# Patient Record
Sex: FEMALE | Race: ASIAN | NOT HISPANIC OR LATINO | Employment: FULL TIME | URBAN - METROPOLITAN AREA
[De-identification: names, ages, dates, MRNs, and addresses within clinical notes are randomized per-mention and may not be internally consistent; named-entity substitution may affect disease eponyms.]

---

## 2019-02-26 ENCOUNTER — OFFICE VISIT (OUTPATIENT)
Dept: PODIATRY | Facility: CLINIC | Age: 54
End: 2019-02-26
Payer: COMMERCIAL

## 2019-02-26 VITALS
SYSTOLIC BLOOD PRESSURE: 130 MMHG | HEIGHT: 59 IN | RESPIRATION RATE: 17 BRPM | HEART RATE: 78 BPM | DIASTOLIC BLOOD PRESSURE: 72 MMHG | BODY MASS INDEX: 26.21 KG/M2 | WEIGHT: 130 LBS

## 2019-02-26 DIAGNOSIS — Q66.52 CONGENITAL PES PLANUS OF LEFT FOOT: ICD-10-CM

## 2019-02-26 DIAGNOSIS — Q66.51 CONGENITAL PES PLANUS OF RIGHT FOOT: ICD-10-CM

## 2019-02-26 DIAGNOSIS — M67.472 GANGLION CYST OF LEFT FOOT: ICD-10-CM

## 2019-02-26 DIAGNOSIS — M79.672 PAIN IN BOTH FEET: ICD-10-CM

## 2019-02-26 DIAGNOSIS — M21.969 ACQUIRED DEFORMITY OF FOOT, UNSPECIFIED LATERALITY: Primary | ICD-10-CM

## 2019-02-26 DIAGNOSIS — M79.671 PAIN IN BOTH FEET: ICD-10-CM

## 2019-02-26 PROBLEM — M25.571 PAIN IN JOINTS OF BOTH FEET: Status: ACTIVE | Noted: 2019-02-26

## 2019-02-26 PROBLEM — M25.572 PAIN IN JOINTS OF BOTH FEET: Status: ACTIVE | Noted: 2019-02-26

## 2019-02-26 PROCEDURE — L3000 FT INSERT UCB BERKELEY SHELL: HCPCS | Performed by: PODIATRIST

## 2019-02-26 PROCEDURE — 73620 X-RAY EXAM OF FOOT: CPT | Performed by: PODIATRIST

## 2019-02-26 PROCEDURE — 99203 OFFICE O/P NEW LOW 30 MIN: CPT | Performed by: PODIATRIST

## 2019-02-26 RX ORDER — MELOXICAM 7.5 MG/1
7.5 TABLET ORAL DAILY
Qty: 30 TABLET | Refills: 0 | OUTPATIENT
Start: 2019-02-26 | End: 2019-03-05

## 2019-02-26 RX ORDER — RIBOFLAVIN (VITAMIN B2) 100 MG
100 TABLET ORAL DAILY
COMMUNITY

## 2019-02-26 RX ORDER — OMEGA-3-ACID ETHYL ESTERS 1 G/1
2 CAPSULE, LIQUID FILLED ORAL 2 TIMES DAILY
COMMUNITY

## 2019-02-26 RX ORDER — NICOTINE POLACRILEX 2 MG
GUM BUCCAL
COMMUNITY

## 2019-02-26 NOTE — PROGRESS NOTES
Assessment/Plan:  Osteoarthritis foot bilateral   Plantar fasciitis bilateral   Rule out inflammatory arthropathy  Rule out Lyme disease  Pes planus  Pain upon ambulation  Ganglion formation left instep  Plan  Foot exam performed  X-rays taken  Blood work is being ordered to rule out inflammatory arthropathy  Patient be started on Mobic  Her feet have been casted for custom molded foot orthotics  We recommend removal of 3 ganglion of left foot  No problem-specific Assessment & Plan notes found for this encounter  There are no diagnoses linked to this encounter  Subjective:  Patient has pain in her feet  She has no history of trauma  Pain has been ongoing for several months  She has pain upon rising  She has pain in the heels  She also has pain in her toes with ambulation  No past medical history on file  No past surgical history on file  Allergies no known allergies      Current Outpatient Medications:     Ascorbic Acid (VITAMIN C) 100 MG tablet, Take 100 mg by mouth daily, Disp: , Rfl:     Biotin 1 MG CAPS, Take by mouth, Disp: , Rfl:     Garlic 10 MG CAPS, Take by mouth, Disp: , Rfl:     omega-3-acid ethyl esters (LOVAZA) 1 g capsule, Take 2 g by mouth 2 (two) times a day, Disp: , Rfl:     There is no problem list on file for this patient  Patient ID: Gail Cohen is a 52 y o  female  HPI    The following portions of the patient's history were reviewed and updated as appropriate: She  has no past medical history on file  She There are no active problems to display for this patient  She  has no past surgical history on file  Her family history is not on file  She  reports that she has never smoked  She has never used smokeless tobacco  Her alcohol and drug histories are not on file    Current Outpatient Medications   Medication Sig Dispense Refill    Ascorbic Acid (VITAMIN C) 100 MG tablet Take 100 mg by mouth daily      Biotin 1 MG CAPS Take by mouth  Garlic 10 MG CAPS Take by mouth      omega-3-acid ethyl esters (LOVAZA) 1 g capsule Take 2 g by mouth 2 (two) times a day       No current facility-administered medications for this visit  Current Outpatient Medications on File Prior to Visit   Medication Sig    Ascorbic Acid (VITAMIN C) 100 MG tablet Take 100 mg by mouth daily    Biotin 1 MG CAPS Take by mouth    Garlic 10 MG CAPS Take by mouth    omega-3-acid ethyl esters (LOVAZA) 1 g capsule Take 2 g by mouth 2 (two) times a day     No current facility-administered medications on file prior to visit  She has No Known Allergies       Vitals:    02/26/19 0934   BP: 130/72   Pulse: 78   Resp: 17       Review of Systems      Objective:  Patient's shoes and socks removed  Foot Exam    General  General Appearance: appears stated age and healthy   Orientation: alert and oriented to person, place, and time   Affect: appropriate   Gait: antalgic       Right Foot/Ankle     Inspection and Palpation  Ecchymosis: none  Tenderness: great toe interphalangeal joint, great toe metatarsophalangeal joint, lesser metatarsophalangeal joints and metatarsals   Swelling: dorsum and metatarsals   Arch: pes planus  Hammertoes: fifth toe  Hallux valgus: no  Hallux limitus: yes  Skin Exam: dry skin;     Neurovascular  Dorsalis pedis: 2+  Posterior tibial: 3+  Achilles reflex: 1+      Left Foot/Ankle      Inspection and Palpation  Ecchymosis: none  Tenderness: great toe interphalangeal joint, great toe metatarsophalangeal joint, lesser metatarsophalangeal joints and metatarsals   Swelling: dorsum and metatarsals   Arch: pes planus  Hammertoes: fifth toe  Hallux valgus: no  Hallux limitus: yes  Skin Exam: dry skin;     Neurovascular  Dorsalis pedis: 2+  Posterior tibial: 3+  Achilles reflex: 1+        Physical Exam   Constitutional: She appears well-developed and well-nourished     Cardiovascular:   Pulses:       Dorsalis pedis pulses are 2+ on the right side, and 2+ on the left side  Posterior tibial pulses are 3+ on the right side, and 3+ on the left side  Musculoskeletal:   Patient is maximally pronated in stance and gait  She has osteoarthritis development of 1st MPJ bilateral   Left instep at the dorsal aspect of the Lisfranc joint demonstrates 3 lobulated cysts in the subcutaneous tissue  They  lumenate with transluminescence   Had appeared to be ganglion  X-ray  Significant osteoarthritis of the 1st MPJ bilateral   This jean joint bilateral demonstrates dorsal spurring  Early plantar calcaneal spurring noted as well   Feet:   Right Foot:   Skin Integrity: Positive for dry skin  Left Foot:   Skin Integrity: Positive for dry skin  Neurological: She is alert  Reflex Scores:       Achilles reflexes are 1+ on the right side and 1+ on the left side  Skin: Skin is warm and dry  Capillary refill takes less than 2 seconds  Psychiatric: She has a normal mood and affect  Vitals reviewed

## 2019-03-01 LAB
ANA SER QL: NEGATIVE
B BURGDOR IGG PATRN SER IB-IMP: NEGATIVE
B BURGDOR IGG+IGM SER-ACNC: <0.91 ISR (ref 0–0.9)
B BURGDOR IGM PATRN SER IB-IMP: NEGATIVE
B BURGDOR18KD IGG SER QL IB: NORMAL
B BURGDOR23KD IGG SER QL IB: NORMAL
B BURGDOR23KD IGM SER QL IB: NORMAL
B BURGDOR28KD IGG SER QL IB: NORMAL
B BURGDOR30KD IGG SER QL IB: NORMAL
B BURGDOR39KD IGG SER QL IB: NORMAL
B BURGDOR39KD IGM SER QL IB: NORMAL
B BURGDOR41KD IGG SER QL IB: NORMAL
B BURGDOR41KD IGM SER QL IB: NORMAL
B BURGDOR45KD IGG SER QL IB: NORMAL
B BURGDOR58KD IGG SER QL IB: NORMAL
B BURGDOR66KD IGG SER QL IB: NORMAL
B BURGDOR93KD IGG SER QL IB: NORMAL
ERYTHROCYTE [SEDIMENTATION RATE] IN BLOOD BY WESTERGREN METHOD: 40 MM/HR (ref 0–40)
RHEUMATOID FACT SERPL-ACNC: 11.8 IU/ML (ref 0–13.9)

## 2019-03-05 ENCOUNTER — HOSPITAL ENCOUNTER (EMERGENCY)
Facility: HOSPITAL | Age: 54
Discharge: HOME | End: 2019-03-05
Attending: EMERGENCY MEDICINE
Payer: COMMERCIAL

## 2019-03-05 ENCOUNTER — APPOINTMENT (EMERGENCY)
Dept: RADIOLOGY | Facility: HOSPITAL | Age: 54
End: 2019-03-05
Payer: COMMERCIAL

## 2019-03-05 VITALS
OXYGEN SATURATION: 99 % | DIASTOLIC BLOOD PRESSURE: 95 MMHG | RESPIRATION RATE: 18 BRPM | HEART RATE: 74 BPM | WEIGHT: 130 LBS | HEIGHT: 59 IN | TEMPERATURE: 99.2 F | BODY MASS INDEX: 26.21 KG/M2 | SYSTOLIC BLOOD PRESSURE: 181 MMHG

## 2019-03-05 DIAGNOSIS — S89.92XA INJURY OF LEFT KNEE, INITIAL ENCOUNTER: Primary | ICD-10-CM

## 2019-03-05 PROCEDURE — 73564 X-RAY EXAM KNEE 4 OR MORE: CPT

## 2019-03-05 PROCEDURE — 99283 EMERGENCY DEPT VISIT LOW MDM: CPT

## 2019-03-05 RX ORDER — NAPROXEN 500 MG/1
500 TABLET ORAL ONCE
Status: COMPLETED | OUTPATIENT
Start: 2019-03-05 | End: 2019-03-05

## 2019-03-05 RX ORDER — NAPROXEN 500 MG/1
500 TABLET ORAL 2 TIMES DAILY WITH MEALS
Qty: 20 TABLET | Refills: 0 | Status: SHIPPED | OUTPATIENT
Start: 2019-03-05 | End: 2019-04-09

## 2019-03-05 RX ADMIN — NAPROXEN 500 MG: 500 TABLET ORAL at 10:27

## 2019-03-28 ENCOUNTER — OFFICE VISIT (OUTPATIENT)
Dept: URGENT CARE | Facility: CLINIC | Age: 54
End: 2019-03-28
Payer: COMMERCIAL

## 2019-03-28 VITALS
OXYGEN SATURATION: 98 % | HEIGHT: 59 IN | SYSTOLIC BLOOD PRESSURE: 163 MMHG | TEMPERATURE: 98.3 F | RESPIRATION RATE: 16 BRPM | HEART RATE: 78 BPM | DIASTOLIC BLOOD PRESSURE: 92 MMHG | BODY MASS INDEX: 28.02 KG/M2 | WEIGHT: 139 LBS

## 2019-03-28 DIAGNOSIS — S83.92XA SPRAIN OF LEFT KNEE, UNSPECIFIED LIGAMENT, INITIAL ENCOUNTER: Primary | ICD-10-CM

## 2019-03-28 DIAGNOSIS — I83.90 UNCOMPLICATED VARICOSE VEINS: ICD-10-CM

## 2019-03-28 PROCEDURE — 99213 OFFICE O/P EST LOW 20 MIN: CPT | Performed by: PHYSICIAN ASSISTANT

## 2019-03-28 NOTE — PATIENT INSTRUCTIONS
Begin wearing compression stockings  Try to put these on before getting out of bed in the morning and remove at bedtime  Take ibuprofen and tylenol as directed  Apply ice to your knee for 20-30 minutes at a time, 3-4 times daily  Rest and elevate your legs as often as possible  Wear an Ace bandage or knee brace during all waking hours and remove at bedtime  Follow up with Orthopedics in the next 5 days  Proceed to the ER if symptoms worsen

## 2019-03-28 NOTE — PROGRESS NOTES
[facility]        NAME: Ziggy Ibanez is a 52 y o  female  : 1965    MRN: [id number]  DATE: 2019  TIME: 4:31 PM    Assessment and Plan   Sprain of left knee, unspecified ligament, initial encounter [S83  92XA]  1  Sprain of left knee, unspecified ligament, initial encounter  Ambulatory referral to Orthopedic Surgery   2  Uncomplicated varicose veins       Patient Instructions   Begin wearing compression stockings  Try to put these on before getting out of bed in the morning and remove at bedtime  Take ibuprofen and tylenol as directed  Apply ice to your knee for 20-30 minutes at a time, 3-4 times daily  Rest and elevate your legs as often as possible  Wear an Ace bandage or knee brace during all waking hours and remove at bedtime  Follow up with Orthopedics in the next 5 days  Proceed to the ER if symptoms worsen  The diagnosis, etiology, expected course of illness, and treatment plan were reviewed  All questions answered  Precautions given  Patient verbalized understanding and agreement the treatment plan  Chief Complaint     Chief Complaint   Patient presents with    Knee Pain     after snow storm leg got jammed when her foot accessed a deep hole and knee became jammed  Had ER visit xray taken given Naprosin and did not help     History of Present Illness   49-year-old female presenting with complaint of left knee pain x 3 5 weeks  Pt notes she injured the leg prior to onset of sx when she stepped into a snowbank while cleaning off her car  She notes the snow/ground moved beneath her causing her to begin to fall forwards  She was able to brace herself prior to falling completely, but notes she felt her knee stop and plant as her upper body went forward  She notes pain was initially in the back of the knee but now is a sharp pain in the front of the knee and the upper inner aspect of the lower leg   Pain associated with swelling that occurred 3-4 days ago, that came on after being on her feet for a long day  Pain is worse with movements such as walking and straightening the leg and relieved with bending and sitting  She has attempted treating with naproxen, but now that completed has switched to Advil 400 mg QD  She was seen in the ER on day after onset  XRays were taken and reported by pt as negative  Pain does not radiate and is not associated with redness, bruising, N/T/W  No previous injury  Review of Systems   Review of Systems   Constitutional: Negative for chills, diaphoresis and fever  Respiratory: Negative for cough (  ), shortness of breath and wheezing  Cardiovascular: Negative for chest pain and palpitations  Gastrointestinal: Negative for abdominal pain, diarrhea, nausea and vomiting  Musculoskeletal: Negative for myalgias  Skin: Negative for color change, rash and wound  Neurological: Negative for light-headedness and headaches  Current Medications       Current Outpatient Medications:     Ascorbic Acid (VITAMIN C) 100 MG tablet, Take 100 mg by mouth daily, Disp: , Rfl:     Biotin 1 MG CAPS, Take by mouth, Disp: , Rfl:     Garlic 10 MG CAPS, Take by mouth, Disp: , Rfl:     omega-3-acid ethyl esters (LOVAZA) 1 g capsule, Take 2 g by mouth 2 (two) times a day, Disp: , Rfl:     naproxen (NAPROSYN) 500 mg tablet, Take 1 tablet (500 mg total) by mouth 2 (two) times a day with meals for 10 days, Disp: 20 tablet, Rfl: 0    Current Allergies     Allergies as of 03/28/2019    (No Known Allergies)          The following portions of the patient's history were reviewed and updated as appropriate: allergies, current medications, past family history, past medical history, past social history, past surgical history and problem list      History reviewed  No pertinent past medical history  History reviewed  No pertinent surgical history  History reviewed  No pertinent family history  Medications have been verified      Objective   /92   Pulse 78 Temp 98 3 °F (36 8 °C)   Resp 16   Ht 4' 10 75" (1 492 m)   Wt 63 kg (139 lb)   SpO2 98%   BMI 28 31 kg/m²      Physical Exam     Physical Exam   Constitutional: She is oriented to person, place, and time  Vital signs are normal  She appears well-developed and well-nourished  She is cooperative  She does not appear ill  No distress  HENT:   Head: Normocephalic and atraumatic  Eyes: Conjunctivae and lids are normal  Right eye exhibits no chemosis, no discharge and no exudate  Left eye exhibits no chemosis, no discharge and no exudate  Right conjunctiva is not injected  Left conjunctiva is not injected  Cardiovascular: Normal rate, regular rhythm and normal heart sounds  Exam reveals no gallop, no distant heart sounds and no friction rub  No murmur heard  Pulmonary/Chest: Effort normal and breath sounds normal  No stridor  No respiratory distress  She has no decreased breath sounds  She has no wheezes  She has no rhonchi  She has no rales  Abdominal: Soft  Bowel sounds are normal  She exhibits no distension and no mass  There is no tenderness  There is no rigidity, no rebound and no guarding  Musculoskeletal:        Left knee: She exhibits swelling (mild edema of the overlying the medial knee and medial menisucs ), LCL laxity and abnormal meniscus (+ Adithya of lateral meniscus)  She exhibits normal range of motion, no effusion, no ecchymosis, no deformity, no laceration, no erythema, normal alignment, normal patellar mobility, no bony tenderness and no MCL laxity  Tenderness found  Medial joint line, lateral joint line and MCL tenderness noted  No LCL and no patellar tendon tenderness noted  Left lower leg: She exhibits tenderness (overlying a superfical anterior varicosity of the medial left shin  No erythema or warmth  )  She exhibits no bony tenderness, no swelling, no edema, no deformity and no laceration  B/l calves measure 34 6 cm  Negative homans sign   No palpable cord, erythema, or warmth of the left lower leg  Pt scores -2 to 0 on Well's Criteria  Neurological: She is alert and oriented to person, place, and time  She has normal strength  She is not disoriented  No cranial nerve deficit  She exhibits normal muscle tone  Coordination and gait normal    Skin: Skin is warm, dry and intact  No rash noted  She is not diaphoretic  No erythema  No pallor  Psychiatric: She has a normal mood and affect  Her behavior is normal  Judgment and thought content normal    Nursing note and vitals reviewed

## 2019-04-05 ENCOUNTER — OFFICE VISIT (OUTPATIENT)
Dept: OBGYN CLINIC | Facility: CLINIC | Age: 54
End: 2019-04-05
Payer: COMMERCIAL

## 2019-04-05 VITALS
BODY MASS INDEX: 27.82 KG/M2 | HEIGHT: 59 IN | HEART RATE: 73 BPM | DIASTOLIC BLOOD PRESSURE: 98 MMHG | SYSTOLIC BLOOD PRESSURE: 139 MMHG | WEIGHT: 138 LBS

## 2019-04-05 DIAGNOSIS — M25.462 EFFUSION OF LEFT KNEE: ICD-10-CM

## 2019-04-05 DIAGNOSIS — M25.562 ACUTE PAIN OF LEFT KNEE: Primary | ICD-10-CM

## 2019-04-05 PROCEDURE — 99204 OFFICE O/P NEW MOD 45 MIN: CPT | Performed by: ORTHOPAEDIC SURGERY

## 2019-04-09 ENCOUNTER — OFFICE VISIT (OUTPATIENT)
Dept: FAMILY MEDICINE CLINIC | Facility: CLINIC | Age: 54
End: 2019-04-09
Payer: COMMERCIAL

## 2019-04-09 VITALS
WEIGHT: 133 LBS | HEIGHT: 59 IN | TEMPERATURE: 98.1 F | HEART RATE: 76 BPM | DIASTOLIC BLOOD PRESSURE: 90 MMHG | BODY MASS INDEX: 26.81 KG/M2 | RESPIRATION RATE: 18 BRPM | SYSTOLIC BLOOD PRESSURE: 132 MMHG | OXYGEN SATURATION: 98 %

## 2019-04-09 DIAGNOSIS — S83.8X2A SPRAIN OF OTHER LIGAMENT OF LEFT KNEE, INITIAL ENCOUNTER: Primary | ICD-10-CM

## 2019-04-09 PROCEDURE — 99213 OFFICE O/P EST LOW 20 MIN: CPT | Performed by: FAMILY MEDICINE

## 2019-06-27 ENCOUNTER — OFFICE VISIT (OUTPATIENT)
Dept: FAMILY MEDICINE CLINIC | Facility: CLINIC | Age: 54
End: 2019-06-27
Payer: COMMERCIAL

## 2019-06-27 VITALS
HEIGHT: 59 IN | OXYGEN SATURATION: 97 % | WEIGHT: 134 LBS | BODY MASS INDEX: 27.01 KG/M2 | DIASTOLIC BLOOD PRESSURE: 80 MMHG | TEMPERATURE: 98.4 F | HEART RATE: 79 BPM | SYSTOLIC BLOOD PRESSURE: 120 MMHG

## 2019-06-27 DIAGNOSIS — Z00.00 WELL ADULT HEALTH CHECK: ICD-10-CM

## 2019-06-27 DIAGNOSIS — Z12.11 SCREENING FOR COLON CANCER: ICD-10-CM

## 2019-06-27 DIAGNOSIS — S83.8X2A ACUTE MEDIAL MENISCAL INJURY OF LEFT KNEE, INITIAL ENCOUNTER: Primary | ICD-10-CM

## 2019-06-27 DIAGNOSIS — Z12.39 SCREENING BREAST EXAMINATION: ICD-10-CM

## 2019-06-27 PROCEDURE — 99213 OFFICE O/P EST LOW 20 MIN: CPT | Performed by: FAMILY MEDICINE

## 2019-06-27 NOTE — PROGRESS NOTES
Assessment/Plan:   Diagnoses and all orders for this visit:    Acute medial meniscal injury of left knee, initial encounter  -     Ambulatory referral to Orthopedic Surgery; Future    BMI 27 0-27 9,adult  -     Lipid panel; Future  -     Basic metabolic panel; Future    Well adult health check  -     Lipid panel; Future  -     Basic metabolic panel; Future    Screening for colon cancer  -     Ambulatory referral to Gastroenterology; Future    Screening breast examination  -     Mammo screening bilateral w cad; Future      Pt doing well at this time, but having further knee pain and need to see ortho for f/u  Pt is due for colonoscopy and mammogram at this time  Will call with results of bloodwork and mammo  Can call with any issues  Subjective:    Patient ID: Niki Niño is a 52 y o  female  HPI  Pt here today for annual physical and complaints of Left knee pain  Worse at night with stretching and rest  Worse as well in am  Pain on (lateral) outside, and feels stiff  Hard to walk up and down stairs  Had imaging done, but has not followed up  Gets flu vaccine annually with work  No other immunizations needed at this time  The following portions of the patient's history were reviewed and updated as appropriate: allergies, current medications, past family history, past medical history, past social history, past surgical history and problem list     Review of Systems   Constitutional: Negative for chills and fever  HENT: Negative for congestion, postnasal drip, sinus pain and sore throat  Respiratory: Negative for cough, choking, chest tightness, shortness of breath and wheezing  Cardiovascular: Negative for chest pain and palpitations  Gastrointestinal: Negative for constipation, diarrhea, nausea and vomiting  Genitourinary: Negative for dysuria and urgency  Musculoskeletal: Positive for arthralgias  Negative for back pain     Neurological: Negative for dizziness, tremors, light-headedness and headaches  Psychiatric/Behavioral: Negative for decreased concentration  The patient is not nervous/anxious  Objective:  /80   Pulse 79   Temp 98 4 °F (36 9 °C)   Ht 4' 11" (1 499 m)   Wt 60 8 kg (134 lb)   SpO2 97%   BMI 27 06 kg/m²      Physical Exam   Constitutional: She is oriented to person, place, and time  She appears well-developed and well-nourished  No distress  HENT:   Head: Normocephalic and atraumatic  Nose: Nose normal    Eyes: Right eye exhibits no discharge  Left eye exhibits no discharge  No scleral icterus  Neck: Normal range of motion  Neck supple  Cardiovascular: Normal rate, regular rhythm, normal heart sounds and intact distal pulses  Exam reveals no gallop and no friction rub  No murmur heard  Pulmonary/Chest: Effort normal and breath sounds normal  No stridor  No respiratory distress  She has no wheezes  Abdominal: Soft  Bowel sounds are normal  She exhibits no distension  There is no tenderness  There is no guarding  Musculoskeletal: Normal range of motion  She exhibits tenderness  She exhibits no edema or deformity  Left knee: She exhibits normal range of motion, no swelling, no effusion and no ecchymosis  Tenderness found  Lateral joint line tenderness noted  No medial joint line, no MCL, no LCL and no patellar tendon tenderness noted  Neurological: She is alert and oriented to person, place, and time  Skin: Skin is warm and dry  No rash noted  She is not diaphoretic  No erythema  No pallor  Psychiatric: She has a normal mood and affect  Her behavior is normal  Judgment and thought content normal    Vitals reviewed

## 2019-07-12 LAB
BUN SERPL-MCNC: 16 MG/DL (ref 6–24)
BUN/CREAT SERPL: 22 (ref 9–23)
CALCIUM SERPL-MCNC: 9.3 MG/DL (ref 8.7–10.2)
CHLORIDE SERPL-SCNC: 106 MMOL/L (ref 96–106)
CHOLEST SERPL-MCNC: 203 MG/DL (ref 100–199)
CHOLEST/HDLC SERPL: 3.2 RATIO (ref 0–4.4)
CO2 SERPL-SCNC: 22 MMOL/L (ref 20–29)
CREAT SERPL-MCNC: 0.73 MG/DL (ref 0.57–1)
GLUCOSE SERPL-MCNC: 88 MG/DL (ref 65–99)
HDLC SERPL-MCNC: 63 MG/DL
LDLC SERPL CALC-MCNC: 118 MG/DL (ref 0–99)
POTASSIUM SERPL-SCNC: 4.2 MMOL/L (ref 3.5–5.2)
SL AMB EGFR AFRICAN AMERICAN: 107 ML/MIN/1.73
SL AMB EGFR NON AFRICAN AMERICAN: 93 ML/MIN/1.73
SL AMB VLDL CHOLESTEROL CALC: 22 MG/DL (ref 5–40)
SODIUM SERPL-SCNC: 140 MMOL/L (ref 134–144)
TRIGL SERPL-MCNC: 111 MG/DL (ref 0–149)

## 2019-08-14 ENCOUNTER — TELEPHONE (OUTPATIENT)
Dept: FAMILY MEDICINE CLINIC | Facility: CLINIC | Age: 54
End: 2019-08-14

## 2019-08-14 NOTE — TELEPHONE ENCOUNTER
Called patient left message to return call     When calls back will ask if scheduled Mammogram, order current

## 2019-08-16 ENCOUNTER — APPOINTMENT (OUTPATIENT)
Dept: RADIOLOGY | Facility: CLINIC | Age: 54
End: 2019-08-16
Payer: COMMERCIAL

## 2019-08-16 ENCOUNTER — OFFICE VISIT (OUTPATIENT)
Dept: OBGYN CLINIC | Facility: CLINIC | Age: 54
End: 2019-08-16
Payer: COMMERCIAL

## 2019-08-16 VITALS
SYSTOLIC BLOOD PRESSURE: 152 MMHG | HEIGHT: 59 IN | HEART RATE: 67 BPM | DIASTOLIC BLOOD PRESSURE: 96 MMHG | WEIGHT: 136.8 LBS | BODY MASS INDEX: 27.58 KG/M2

## 2019-08-16 DIAGNOSIS — M25.462 EFFUSION OF LEFT KNEE: ICD-10-CM

## 2019-08-16 DIAGNOSIS — S82.132A CLOSED FRACTURE OF MEDIAL PORTION OF LEFT TIBIAL PLATEAU, INITIAL ENCOUNTER: Primary | ICD-10-CM

## 2019-08-16 DIAGNOSIS — S83.242A OTHER TEAR OF MEDIAL MENISCUS OF LEFT KNEE AS CURRENT INJURY, INITIAL ENCOUNTER: ICD-10-CM

## 2019-08-16 DIAGNOSIS — M25.562 ACUTE PAIN OF LEFT KNEE: ICD-10-CM

## 2019-08-16 PROCEDURE — 73560 X-RAY EXAM OF KNEE 1 OR 2: CPT

## 2019-08-16 PROCEDURE — 99214 OFFICE O/P EST MOD 30 MIN: CPT | Performed by: ORTHOPAEDIC SURGERY

## 2019-08-16 NOTE — LETTER
August 16, 2019     Patient: Janey Jackson   YOB: 1965   Date of Visit: 8/16/2019       To Whom it May Concern:    Kareem Kohler is under my professional care  She was seen in my office on 8/16/2019  She needs to be non-weight bearing on her left leg for 4-6 weeks  If this cannot be accommodated for work purposes, she needs to remain out of work during this time  If you have any questions or concerns, please don't hesitate to call  Sincerely,          Fidel French DO        CC: Leonila Cui

## 2019-08-16 NOTE — PROGRESS NOTES
Assessment/Plan:  1  Closed fracture of medial portion of left tibial plateau, initial encounter     2  Other tear of medial meniscus of left knee as current injury, initial encounter     3  Effusion of left knee  CANCELED: XR knee 3 vw left non injury   4  Acute pain of left knee  CANCELED: XR knee 3 vw left non injury     Leonila is a pleasant 66-year-old female presenting today for follow-up left knee pain after an injury 4 months ago  In reviewing her MRI, she did suffer a nondisplaced depression type fracture of the medial tibial plateau  However, since she was lost to follow-up, she was not treated appropriately and has been weight-bearing on this for the past 4 months  She also has evidence of a posterior horn medial meniscus tear, but it is not clear if she is symptomatic of this or the medial tibial plateau fracture  Fortunately, the x-rays today does not demonstrate any displacement or worsening of the fracture of the medial tibial plateau  We have recommended to treat the fracture now as we would have initially, as it is probable her pain has perpetuated because of her weight bearing  We have recommended that she use a hinged knee brace and crutches, both of which she has at home, and avoid putting weight on her left leg for the next 6 weeks  If her work does not have light duty available, she will need to be out of work during this time  We will see her back in 6 weeks for reevaluation and can determine if her symptoms may be due to the meniscus tear or from the fracture  She expressed understanding  All of her questions were addressed today  Subjective:  Left knee follow-up    Patient ID: Shilo Rogers is a 62 y o  female  Sarina Verdugo is a pleasant 66-year-old female presenting today for follow-up of her left knee  At her initial appointment 4 months ago, there was concern for an occult fracture after an injury stepping into a snow bank    She did get the MRI 6 days after the appointment, but no report was ever received by office and she was subsequently lost to follow-up  She has been ambulating without any assistive devices and only sporadically using the hinged knee brace that was provided  She has seen her primary care physician twice in the interim for left knee pain, but continues to have discomfort with prolonged standing and walking  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  She has significant difficulty bending her knee due to pain  This has been affecting her daily basis  She denies any feelings of instability  Review of Systems   Constitutional: Negative  HENT: Negative  Eyes: Negative  Respiratory: Negative  Cardiovascular: Negative  Gastrointestinal: Negative  Endocrine: Negative  Genitourinary: Negative  Musculoskeletal: Positive for arthralgias  Skin: Negative  Allergic/Immunologic: Negative  Neurological: Negative  Hematological: Negative  Psychiatric/Behavioral: Negative  History reviewed  No pertinent past medical history  History reviewed  No pertinent surgical history  History reviewed  No pertinent family history  Social History     Occupational History    Not on file   Tobacco Use    Smoking status: Never Smoker    Smokeless tobacco: Never Used   Substance and Sexual Activity    Alcohol use: Never     Frequency: Never    Drug use: Never    Sexual activity: Not Currently         Current Outpatient Medications:     Ascorbic Acid (VITAMIN C) 100 MG tablet, Take 100 mg by mouth daily, Disp: , Rfl:     Biotin 1 MG CAPS, Take by mouth, Disp: , Rfl:     Garlic 10 MG CAPS, Take by mouth, Disp: , Rfl:     omega-3-acid ethyl esters (LOVAZA) 1 g capsule, Take 2 g by mouth 2 (two) times a day, Disp: , Rfl:     No Known Allergies    Objective:  Vitals:    08/16/19 0918   BP: 152/96   Pulse: 67       Body mass index is 27 87 kg/m²      Left Knee Exam     Muscle Strength   The patient has normal left knee strength  Tenderness   The patient is experiencing tenderness in the lateral joint line, medial joint line, MCL, medial retinaculum and pes anserinus (tender medial tibial plateau)  Range of Motion   Extension:  0 normal   Flexion:  110 (pain end range of flexion) abnormal     Tests   Adithya:  Medial - positive Lateral - positive  Varus: negative Valgus: negative  Drawer:  Anterior - negative     Posterior - negative  Patellar apprehension: negative    Other   Erythema: absent  Scars: absent  Sensation: normal  Pulse: present  Swelling: none  Effusion: effusion (scant) present    Comments:  Collateral ligaments stable at 0, 30 and 90  Thigh and calf soft and non tender  Ambulates with an antalgic gait on left with no brace or assistive device  Positive patellofemoral crepitus           Observations   Left Knee   Positive for effusion (scant)  Physical Exam   Constitutional: She is oriented to person, place, and time  She appears well-developed and well-nourished  Body mass index is 27 87 kg/m²  HENT:   Head: Normocephalic and atraumatic  Eyes: EOM are normal    Neck: Normal range of motion  Cardiovascular: Intact distal pulses  Pulmonary/Chest: Effort normal    Musculoskeletal:        Left knee: She exhibits effusion (scant)  See ortho exam   Neurological: She is alert and oriented to person, place, and time  Skin: Skin is warm and dry  Psychiatric: She has a normal mood and affect  Her behavior is normal  Judgment and thought content normal    Nursing note and vitals reviewed  I have personally reviewed pertinent films in PACS of the MRI of her left knee taken in April  There is evidence of a nondisplaced subchondral depression type fracture of the medial tibial plateau with significant bony edema  There is also evidence of a posterior medial meniscus root tear and a horizontal lateral meniscus tear  There is also chondromalacia of the medial patellofemoral compartments  Because the MRI was 3month-old, we also sent her for repeat x-rays to evaluate if there has been any change in the fracture site  Her mild osteoarthritic changes were again appreciated on x-rays today, but there was no worsening of the medial tibial plateau nondisplaced depression fracture

## 2019-09-05 ENCOUNTER — CONSULT (OUTPATIENT)
Dept: GASTROENTEROLOGY | Facility: CLINIC | Age: 54
End: 2019-09-05
Payer: COMMERCIAL

## 2019-09-05 VITALS
WEIGHT: 141 LBS | RESPIRATION RATE: 18 BRPM | DIASTOLIC BLOOD PRESSURE: 80 MMHG | SYSTOLIC BLOOD PRESSURE: 118 MMHG | HEART RATE: 80 BPM | HEIGHT: 58 IN | TEMPERATURE: 98.6 F | BODY MASS INDEX: 29.6 KG/M2

## 2019-09-05 DIAGNOSIS — Z12.11 SCREENING FOR COLON CANCER: Primary | ICD-10-CM

## 2019-09-05 PROCEDURE — 99243 OFF/OP CNSLTJ NEW/EST LOW 30: CPT | Performed by: INTERNAL MEDICINE

## 2019-09-05 NOTE — PROGRESS NOTES
Consultation - [facility]) [facility]  [address] 1965 female         Chief Complaint:  For colonoscopy    HPI:  57-year-old female with no significant past medical history was referred for colonoscopy  Patient has regular bowel movements and denies any blood or mucus in the stool  Appetite is good and denies any recent weight loss  Denies any abdominal pain, nausea, or vomiting  Has no heartburn or acid reflux  Denies any difficulty swallowing  REVIEW OF SYSTEMS: Review of Systems   Constitutional: Negative for activity change, appetite change, chills, diaphoresis, fatigue, fever and unexpected weight change  HENT: Negative for ear discharge, ear pain, facial swelling, hearing loss, nosebleeds, sore throat, tinnitus and voice change  Eyes: Negative for pain, discharge, redness, itching and visual disturbance  Respiratory: Negative for apnea, cough, chest tightness, shortness of breath and wheezing  Cardiovascular: Negative for chest pain and palpitations  Gastrointestinal:        As noted in HPI   Endocrine: Negative for cold intolerance, heat intolerance and polyuria  Genitourinary: Negative for difficulty urinating, dysuria, flank pain, hematuria and urgency  Musculoskeletal: Positive for arthralgias  Negative for back pain, gait problem, joint swelling and myalgias  Skin: Negative for rash and wound  Neurological: Negative for dizziness, tremors, seizures, speech difficulty, light-headedness, numbness and headaches  Hematological: Negative for adenopathy  Does not bruise/bleed easily  Psychiatric/Behavioral: Negative for agitation, behavioral problems and confusion  The patient is not nervous/anxious  No past medical history on file  No past surgical history on file    Social History     Socioeconomic History    Marital status: /Civil Union     Spouse name: Not on file    Number of children: Not on file    Years of education: Not on file    Highest education level: Not on file   Occupational History    Not on file   Social Needs    Financial resource strain: Not on file    Food insecurity:     Worry: Not on file     Inability: Not on file    Transportation needs:     Medical: Not on file     Non-medical: Not on file   Tobacco Use    Smoking status: Never Smoker    Smokeless tobacco: Never Used   Substance and Sexual Activity    Alcohol use: Never     Frequency: Never    Drug use: Never    Sexual activity: Not Currently   Lifestyle    Physical activity:     Days per week: Not on file     Minutes per session: Not on file    Stress: Not on file   Relationships    Social connections:     Talks on phone: Not on file     Gets together: Not on file     Attends [employer]orationist service: Not on file     Active member of club or organization: Not on file     Attends meetings of clubs or organizations: Not on file     Relationship status: Not on file    Intimate partner violence:     Fear of current or ex partner: Not on file     Emotionally abused: Not on file     Physically abused: Not on file     Forced sexual activity: Not on file   Other Topics Concern    Not on file   Social History Narrative    Not on file     No family history on file  Patient has no known allergies  Current Outpatient Medications   Medication Sig Dispense Refill    Ascorbic Acid (VITAMIN C) 100 MG tablet Take 100 mg by mouth daily      Biotin 1 MG CAPS Take by mouth      Garlic 10 MG CAPS Take by mouth      omega-3-acid ethyl esters (LOVAZA) 1 g capsule Take 2 g by mouth 2 (two) times a day      Na Sulfate-K Sulfate-Mg Sulf (SUPREP BOWEL PREP KIT) 17 5-3 13-1 6 GM/177ML SOLN Take 2 Bottles by mouth see administration instructions Please follow the instructions from the office 2 Bottle 0     No current facility-administered medications for this visit  Blood pressure 118/80, pulse 80, temperature 98 6 °F (37 °C), resp   rate 18, height 4' 10" (1 473 m), weight 64 kg (141 lb), not currently breastfeeding  PHYSICAL EXAM: Physical Exam   Constitutional: She is oriented to person, place, and time  She appears well-developed and well-nourished  HENT:   Head: Normocephalic and atraumatic  Nose: Nose normal    Mouth/Throat: Oropharynx is clear and moist    Eyes: Conjunctivae are normal  Right eye exhibits no discharge  Left eye exhibits no discharge  No scleral icterus  Neck: Neck supple  No JVD present  No tracheal deviation present  No thyromegaly present  Cardiovascular: Normal rate, regular rhythm and normal heart sounds  Exam reveals no gallop and no friction rub  No murmur heard  Pulmonary/Chest: Effort normal and breath sounds normal  No respiratory distress  She has no wheezes  She has no rales  She exhibits no tenderness  Abdominal: Soft  Bowel sounds are normal  She exhibits no distension and no mass  There is no tenderness  There is no rebound and no guarding  No hernia  Musculoskeletal: She exhibits no edema, tenderness or deformity  Lymphadenopathy:     She has no cervical adenopathy  Neurological: She is alert and oriented to person, place, and time  Skin: Skin is warm and dry  No rash noted  No erythema  Psychiatric: She has a normal mood and affect  Her behavior is normal  Thought content normal         No results found for: WBC, HGB, HCT, MCV, PLT  Lab Results   Component Value Date    K 4 2 07/11/2019    CO2 22 07/11/2019     07/11/2019    BUN 16 07/11/2019    CREATININE 0 73 07/11/2019     No results found for: ALT, AST, GGT, ALKPHOS, BILITOT  No results found for: INR, PROTIME    Xr Knee 4+ Views Left Injury    Result Date: 3/5/2019  Impression: No acute osseous abnormality  Workstation performed: [id number]       ASSESSMENT & PLAN:    Screening for colon cancer  Screening for colon cancer - patient is at average risk for colon cancer screening    Rule out colorectal lesions including polyps or malignancy         -Schedule for colonoscopy  -High-fiber diet     -Patient was given instructions about the colonoscopy prep     -Patient was explained about  the risks and benefits of the procedure  Risks including but not limited to bleeding, infection, perforation were explained in detail  Also explained about less than 100% sensitivity with the exam and other alternatives

## 2019-09-27 ENCOUNTER — APPOINTMENT (OUTPATIENT)
Dept: RADIOLOGY | Facility: CLINIC | Age: 54
End: 2019-09-27
Payer: COMMERCIAL

## 2019-09-27 ENCOUNTER — OFFICE VISIT (OUTPATIENT)
Dept: OBGYN CLINIC | Facility: CLINIC | Age: 54
End: 2019-09-27
Payer: COMMERCIAL

## 2019-09-27 VITALS
WEIGHT: 142.2 LBS | BODY MASS INDEX: 28.67 KG/M2 | HEIGHT: 59 IN | DIASTOLIC BLOOD PRESSURE: 81 MMHG | HEART RATE: 70 BPM | SYSTOLIC BLOOD PRESSURE: 120 MMHG

## 2019-09-27 DIAGNOSIS — M25.562 CHRONIC PAIN OF LEFT KNEE: ICD-10-CM

## 2019-09-27 DIAGNOSIS — S82.132A CLOSED FRACTURE OF MEDIAL PORTION OF LEFT TIBIAL PLATEAU, INITIAL ENCOUNTER: ICD-10-CM

## 2019-09-27 DIAGNOSIS — S82.132D CLOSED FRACTURE OF MEDIAL PORTION OF LEFT TIBIAL PLATEAU WITH ROUTINE HEALING, SUBSEQUENT ENCOUNTER: ICD-10-CM

## 2019-09-27 DIAGNOSIS — S83.242D OTHER TEAR OF MEDIAL MENISCUS OF LEFT KNEE AS CURRENT INJURY, SUBSEQUENT ENCOUNTER: ICD-10-CM

## 2019-09-27 DIAGNOSIS — G89.29 CHRONIC PAIN OF LEFT KNEE: ICD-10-CM

## 2019-09-27 DIAGNOSIS — M17.12 PRIMARY OSTEOARTHRITIS OF LEFT KNEE: Primary | ICD-10-CM

## 2019-09-27 PROCEDURE — 99213 OFFICE O/P EST LOW 20 MIN: CPT | Performed by: ORTHOPAEDIC SURGERY

## 2019-09-27 PROCEDURE — 73562 X-RAY EXAM OF KNEE 3: CPT

## 2019-09-27 NOTE — PROGRESS NOTES
Assessment/Plan:  1  Primary osteoarthritis of left knee     2  Closed fracture of medial portion of left tibial plateau with routine healing, subsequent encounter  XR knee 3 vw left non injury   3  Other tear of medial meniscus of left knee as current injury, subsequent encounter     4  Chronic pain of left knee       Leonila is a pleasant 57-year-old female presenting today follow-up of left knee pain  Her medial tibial plateau fracture is now over 10month-old  Despite the fact that she was not compliant with the nonweightbearing recommendations, symptoms have improved  She is not symptomatic of the meniscus tear seen on MRI  Currently, complaints more likely related to underlying osteoarthritis  We discussed treatment options with her here today  She is not interested in a cortisone injection as she does not feel she has enough pain to merit that  Therefore, we have encouraged her to continue with her hinged brace as needed  She can also take Tylenol and over-the-counter NSAIDs for any pain  She may return to work next Monday without restrictions, and a note was provided stating as such  She can follow up with our office on an as-needed basis  She understands that the next treatment option for her would be a cortisone injection  All of her questions were addressed today  Subjective: Left knee follow up    Patient ID: Janey Jackson is a 62 y o  female  Garima Ford is a pleasant 57-year-old female presenting today for follow-up of her left knee pain  It was found at her last appointment 6 weeks ago that she had a 3month-old medial tibial plateau fracture and meniscus tear but was lost to follow-up  We have recommended that she be nonweightbearing and use a hinged knee brace  She reports that she did use the hinged knee brace, but has been weight-bearing on the left leg throughout the last 6 weeks  She has remained out of work as requested    She reports approximately an 80% improvement in her symptoms, but still has discomfort over the medial tibia with weight-bearing  She denies any new injuries or symptoms  Review of Systems   Constitutional: Negative  HENT: Negative  Eyes: Negative  Respiratory: Negative  Cardiovascular: Negative  Gastrointestinal: Negative  Endocrine: Negative  Genitourinary: Negative  Musculoskeletal: Positive for arthralgias, joint swelling and myalgias  Skin: Negative  Allergic/Immunologic: Negative  Neurological: Negative  Hematological: Negative  Psychiatric/Behavioral: Negative  History reviewed  No pertinent past medical history  History reviewed  No pertinent surgical history  History reviewed  No pertinent family history  Social History     Occupational History    Not on file   Tobacco Use    Smoking status: Never Smoker    Smokeless tobacco: Never Used   Substance and Sexual Activity    Alcohol use: Never     Frequency: Never    Drug use: Never    Sexual activity: Not Currently         Current Outpatient Medications:     Ascorbic Acid (VITAMIN C) 100 MG tablet, Take 100 mg by mouth daily, Disp: , Rfl:     Biotin 1 MG CAPS, Take by mouth, Disp: , Rfl:     Garlic 10 MG CAPS, Take by mouth, Disp: , Rfl:     Na Sulfate-K Sulfate-Mg Sulf (SUPREP BOWEL PREP KIT) 17 5-3 13-1 6 GM/177ML SOLN, Take 2 Bottles by mouth see administration instructions Please follow the instructions from the office, Disp: 2 Bottle, Rfl: 0    omega-3-acid ethyl esters (LOVAZA) 1 g capsule, Take 2 g by mouth 2 (two) times a day, Disp: , Rfl:     No Known Allergies    Objective:  Vitals:    09/27/19 0853   BP: 120/81   Pulse: 70       Body mass index is 28 97 kg/m²  Left Knee Exam     Muscle Strength   The patient has normal left knee strength  Tenderness   The patient is experiencing tenderness in the medial joint line and MCL (tender medial tibial plateau)      Range of Motion   Extension:  0 normal   Flexion:  110 (pain end range of flexion) abnormal     Tests   Adithya:  Medial - negative Lateral - negative  Varus: negative Valgus: negative  Drawer:  Anterior - negative     Posterior - negative  Patellar apprehension: negative    Other   Erythema: absent  Scars: absent  Sensation: normal  Pulse: present  Swelling: none  Effusion: effusion (scant) present    Comments:  Collateral ligaments stable at 0, 30 and 90  Thigh and calf soft and non tender  Ambulates with an antalgic gait on left with no brace or assistive device  Positive patellofemoral crepitus           Observations   Left Knee   Positive for effusion (scant)  Physical Exam   Constitutional: She is oriented to person, place, and time  She appears well-developed and well-nourished  Body mass index is 28 97 kg/m²  HENT:   Head: Normocephalic and atraumatic  Eyes: EOM are normal    Neck: Normal range of motion  Cardiovascular: Intact distal pulses  Pulmonary/Chest: Effort normal    Musculoskeletal:        Left knee: She exhibits effusion (scant)  See ortho exam   Neurological: She is alert and oriented to person, place, and time  Skin: Skin is warm and dry  Psychiatric: She has a normal mood and affect  Her behavior is normal  Judgment and thought content normal    Nursing note and vitals reviewed  I have personally reviewed pertinent films in PACS of the x-rays taken today of her left knee which show no change in alignment of the depression of medial tibial plateau  The fracture lines visible x-ray  She does have mild degenerative changes the left knee    There is no other significant abnormality

## 2019-09-27 NOTE — LETTER
September 27, 2019     Patient: Cristy Razo   YOB: 1965   Date of Visit: 9/27/2019       To Whom it May Concern:    Luz Maria Enrique is under my professional care  She was seen in my office on 9/27/2019  She may return to work on 9/30/19 without restrictions  She should be allowed to wear a knee brace as needed       If you have any questions or concerns, please don't hesitate to call           Sincerely,          Stephane Rodriguez DO        CC: No Recipients

## 2019-10-15 ENCOUNTER — TELEPHONE (OUTPATIENT)
Dept: GASTROENTEROLOGY | Facility: AMBULARY SURGERY CENTER | Age: 54
End: 2019-10-15

## 2019-10-15 NOTE — TELEPHONE ENCOUNTER
Patients GI provider:  Jodi Walsh     Number to return call: ([phone]    Reason for call: Pt calling to r/s her procedure because of work conflict      Scheduled procedure/appointment date if applicable:procedure  22/06

## 2019-11-29 ENCOUNTER — TELEPHONE (OUTPATIENT)
Dept: GASTROENTEROLOGY | Facility: AMBULARY SURGERY CENTER | Age: 54
End: 2019-11-29

## 2019-11-29 NOTE — TELEPHONE ENCOUNTER
Patients GI provider:  Dr Walker Certain    Number to return call: ( [phone]    Reason for call: Pt calling to cancel and will call back to rs at later time    Scheduled procedure/appointment date if applicable: Apt/procedure 12-29-19 colon

## 2020-03-21 ENCOUNTER — NURSE TRIAGE (OUTPATIENT)
Dept: OTHER | Facility: OTHER | Age: 55
End: 2020-03-21

## 2020-03-21 NOTE — TELEPHONE ENCOUNTER
Reason for Disposition   [1] COVID-19 EXPOSURE within last 14 days AND [2] mild body aches, chills, diarrhea, headache, runny nose, or sore throat occur    Additional Information   Negative: Severe difficulty breathing (e g , struggling for each breath, speak in single words, bluish lips)   Negative: Sounds like a life-threatening emergency to the triager   Negative: [1] Difficulty breathing (shortness of breath) occurs AND [2] onset > 14 days after COVID-19 EXPOSURE (Close Contact)   Negative: [1] Dry cough occurs AND [2] onset > 14 days after COVID-19 EXPOSURE   Negative: [1] Wet cough (i e , white-yellow, yellow, green, or aurea colored sputum) AND [2] onset > 14 days after COVID-19 EXPOSURE   Negative: [1] Common cold symptoms AND [2] onset > 14 days after COVID-19 EXPOSURE   Negative: [1] Difficulty breathing occurs AND [2] within 14 days of COVID-19 EXPOSURE (Close Contact)   Negative: Patient sounds very sick or weak to the triager   Negative: [1] Fever or feeling feverish AND [2] within 14 Days of COVID-19 EXPOSURE (Close Contact)   Negative: [1] Cough occurs AND [2] within 14 days of COVID-19 EXPOSURE   Negative: [1] Fever (or feeling feverish) OR cough occurs AND [2] travel from or living in high risk area (identified by CDC) AND [3] within last 14 days   Negative: [1] COVID-19 EXPOSURE within last 14 days AND [2] NO cough, fever, or breathing difficulty AND [3] exposed person is a healthcare worker who was NOT using all recommended personal protective equipment (i e , a respirator-N95 mask, eye protection, gloves, and gown)    Answer Assessment - Initial Assessment Questions  1  CONFIRMED CASE: "Who is the person with the confirmed COVID-19 infection that you were exposed to?"      Six people from [place]  2  PLACE of CONTACT: "Where were you when you were exposed to COVID-19  (coronavirus disease 2019)?" (e g , city, state, country)      [facility]  3   TYPE of CONTACT: "How much contact was there?" (e g , live in same house, work in same office, same school)      No physical contact  4  DATE of CONTACT: "When did you have contact with a coronavirus patient?" (e g , days)      6 days ago  5  DURATION of CONTACT: "How long were you in contact with the COVID-19 (coronavirus disease) patient?" (e g , a few seconds, passed by person, a few minutes, live with the patient)      A few mins ago  6  SYMPTOMS: "Do you have any symptoms?" (e g , fever, cough, breathing difficulty)      Tired, SOB with walking, body aches, no fevers, no cough  7  PREGNANCY OR POSTPARTUM: "Is there any chance you are pregnant?" "When was your last menstrual period?" "Did you deliver in the last 2 weeks?"      No  8  HIGH RISK: "Do you have any heart or lung problems?  Do you have a weakened immune system?" (e g , CHF, COPD, asthma, HIV positive, chemotherapy, renal failure, diabetes mellitus, sickle cell anemia)      None    Protocols used: CORONAVIRUS (COVID-19) EXPOSURE-ADULT-AH

## 2020-03-21 NOTE — TELEPHONE ENCOUNTER
Regarding: Stomach Flu  ----- Message from Viktoria Trujillo sent at 3/21/2020 10:32 AM EDT -----  "I feel like I have a stomach flu  I had one loose stool   I don't have any fever "

## 2020-03-23 ENCOUNTER — DOCUMENTATION (OUTPATIENT)
Dept: URGENT CARE | Facility: CLINIC | Age: 55
End: 2020-03-23

## 2020-03-23 ENCOUNTER — TELEPHONE (OUTPATIENT)
Dept: FAMILY MEDICINE CLINIC | Facility: CLINIC | Age: 55
End: 2020-03-23
Payer: COMMERCIAL

## 2020-03-23 ENCOUNTER — TELEMEDICINE (OUTPATIENT)
Dept: FAMILY MEDICINE CLINIC | Facility: CLINIC | Age: 55
End: 2020-03-23
Payer: COMMERCIAL

## 2020-03-23 DIAGNOSIS — Z20.828 EXPOSURE TO SARS-ASSOCIATED CORONAVIRUS: Primary | ICD-10-CM

## 2020-03-23 PROCEDURE — NC001 PR NO CHARGE

## 2020-03-23 PROCEDURE — 99212 OFFICE O/P EST SF 10 MIN: CPT | Performed by: FAMILY MEDICINE

## 2020-03-23 NOTE — PROGRESS NOTES
Virtual Brief Visit    Reason for visit is return to work letter      Encounter provider Jayden Carr DO    Provider located at [address] [place] [id number]      Recent Visits  No visits were found meeting these conditions  Showing recent visits within past 7 days and meeting all other requirements     Today's Visits  Date Type Provider Dept   03/23/20 Telephone [facility]  [place]    Showing today's visits and meeting all other requirements     Future Appointments  No visits were found meeting these conditions  Showing future appointments within next 150 days and meeting all other requirements        Patient agrees to participate in a virtual check in via telephone or video visit instead of presenting to the office to address urgent/immediate medical needs  Patient is aware this is a billable service  After connecting through telephone, the patient was identified by name and date of birth  Richradson Valerio was informed that this was a telemedicine visit and that the visit is being conducted through telephone which may not be secure and therefore might not be HIPAA-compliant  Other methods to assure confidentiality were taken  No one else was in the room  She acknowledged consent and understanding of privacy and security of the virtual check-in visit  I informed the patient that I have reviewed her record in Epic and presented the opportunity for her to ask any questions regarding the visit today  The patient initiated communication and agreed to participate  Galileo Todd is a 62 y o  female calls to discuss return to work parameters  Patient states that she was exposed to a [employer] member with known COVID-19 on 3/15/20  Patient endorses fever on 3/21/20 to 101  She took Advil, today temperature is 100 0, patient reports HA and lingering dry cough  Overall, symptoms have improved   Patient denies SOB, body aches and recent travels  No past medical history on file  No past surgical history on file  Current Outpatient Medications   Medication Sig Dispense Refill    Ascorbic Acid (VITAMIN C) 100 MG tablet Take 100 mg by mouth daily      Biotin 1 MG CAPS Take by mouth      Garlic 10 MG CAPS Take by mouth      Na Sulfate-K Sulfate-Mg Sulf (SUPREP BOWEL PREP KIT) 17 5-3 13-1 6 GM/177ML SOLN Take 2 Bottles by mouth see administration instructions Please follow the instructions from the office 2 Bottle 0    omega-3-acid ethyl esters (LOVAZA) 1 g capsule Take 2 g by mouth 2 (two) times a day       No current facility-administered medications for this visit  No Known Allergies    Assessment    Leonila telephone assessment is patient is stable / NAD   Disposition:    Self quarantined for at least for an additional week based on reported potential exposure at [facility] on 3/15/30  Precaution given, the importance of self quarantine and frequent hand washing emphasized  Order placed for patient to present to [address] [facility] today 3/23/20  Patient made aware  Patient instructed to call the office should her sx worsen/fail to improved  I spent 10 minutes with the patient during this virtual check-in visit

## 2020-03-23 NOTE — TELEPHONE ENCOUNTER
COVID-19 Virtual Visit     Encounter provider Stan Yan    Provider located at [address] [place] [id number]    Recent Visits  No visits were found meeting these conditions  Showing recent visits within past 7 days and meeting all other requirements     Today's Visits  Date Type Provider Dept   03/23/20 Telephone [facility]   Showing today's visits and meeting all other requirements     Future Appointments  No visits were found meeting these conditions  Showing future appointments within next 150 days and meeting all other requirements        Patient agrees to participate in a virtual check in via telephone or video visit instead of presenting to the office to address urgent/immediate medical needs  Patient is aware this is a billable service  After connecting through telephone, the patient was identified by name and date of birth  Jeromy Velásquez was informed that this was a telemedicine visit and that the exam was being conducted confidentially over secure lines  My office door was closed  No one else was in the room  Jeromy Velásquez acknowledged consent and understanding of privacy and security of the telemedicine visit  I informed the patient that I have reviewed her record in Epic and presented the opportunity for her to ask any questions regarding the visit today  The patient agreed to participate  Jeromy Velásquez is a 62 y o  female who is concerned about COVID-19  She reports fever  She has not traveled outside the [place]  within the last 14 days    She has not had contact with a person who is under investigation for or who is positive for COVID-19 within the last 14 days  She has not been hospitalized recently for fever and/or lower respiratory symptoms  No past medical history on file  No past surgical history on file      Current Outpatient Medications   Medication Sig Dispense Refill    Ascorbic Acid (VITAMIN C) 100 MG tablet Take 100 mg by mouth daily      Biotin 1 MG CAPS Take by mouth      Garlic 10 MG CAPS Take by mouth      Na Sulfate-K Sulfate-Mg Sulf (SUPREP BOWEL PREP KIT) 17 5-3 13-1 6 GM/177ML SOLN Take 2 Bottles by mouth see administration instructions Please follow the instructions from the office 2 Bottle 0    omega-3-acid ethyl esters (LOVAZA) 1 g capsule Take 2 g by mouth 2 (two) times a day       No current facility-administered medications for this visit  No Known Allergies    Video Exam  Disposition:      Patient scheduled for virtual visit slight cough, epigastric discomfort no diarrhea or vomiting and slight fever   Symptoms persisting from weekend

## 2020-03-23 NOTE — TELEPHONE ENCOUNTER
Do you have a fever of 100 5 and above?:NO  Do you have a cough? :Yes  Do you have shortness of breath?:NO    Have you visited [place]?:no  [address] [place]?:no  [place]?:no  [place]?:no  [place]?:no    Have you visited [place]?:no  [place]?:no  [place]?:no    Have you visited [place]:?no  [id number] [place] [address]?:no  [address] ([place] [address]) state?:no  [facility]?:no    Have you been exposed to someone who has been diagnosed with coronavirus?:no    Do you commute daily to and from [place] or [place] for work?: no    ** Patient is C/o Cough,back pain, and dizziness  She had a tempeture of 101 0 and this has since coming down after taking Advil   Patient states her Temp now is 100 0

## 2020-03-30 ENCOUNTER — TELEMEDICINE (OUTPATIENT)
Dept: FAMILY MEDICINE CLINIC | Facility: CLINIC | Age: 55
End: 2020-03-30
Payer: COMMERCIAL

## 2020-03-30 ENCOUNTER — TELEPHONE (OUTPATIENT)
Dept: FAMILY MEDICINE CLINIC | Facility: CLINIC | Age: 55
End: 2020-03-30

## 2020-03-30 DIAGNOSIS — U07.1 COVID-19 VIRUS INFECTION: Primary | ICD-10-CM

## 2020-03-30 PROCEDURE — 99442 PR PHYS/QHP TELEPHONE EVALUATION 11-20 MIN: CPT | Performed by: FAMILY MEDICINE

## 2020-03-30 NOTE — PROGRESS NOTES
Virtual Brief Visit    Problem List Items Addressed This Visit     None      Visit Diagnoses     COVID-19 virus infection    -  Primary        Patient is doing well  She reports symptoms are improving but she still has a lingering cough  Advised that she stay home on quarantine until 2 weeks after her test was done, which would be 4/6/20  Patient verbalized understanding and all questions were answered  Reason for visit is COVID Follow up    Encounter provider Anjum Rose DO    Provider located at [address] [place] [phone]      Recent Visits  Date Type Provider Dept   03/23/20 Ryann Blackwood Replaced by [facility] [place] [facility]   03/23/20 Telephone Yudith Mercer [facility]   Showing recent visits within past 7 days and meeting all other requirements     Today's Visits  Date Type Provider Dept   03/30/20  Edmond Deshpande [facility],  [facility]   03/30/20 Telephone [facility]IRA Gonsalez [facility]   Showing today's visits and meeting all other requirements     Future Appointments  Date Type Provider Dept   03/30/20 Telemedicine Anjum Rose DO [facility]   Showing future appointments within next 150 days and meeting all other requirements        After connecting through telephone, the patient was identified by name and date of birth  Fernley Katy was informed that this is a telemedicine visit and that the visit is being conducted through telephone  She acknowledged consent and understanding of privacy and security of the video platform  The patient has agreed to participate and understands they can discontinue the visit at any time  Patient is aware this is a billable service  Onelia Baumann is a 62 y o  female whose COVID test resulted (+) yesterday afternoon   She initially was contacted for virtual visit on 3/23/20 via telephone reporting fever, dry cough following exposure to confirmed COVID (+) at a [employer] service on 3/15/20  Today she reports that her symptoms are much improved but she continues with mild dry cough  Denies fever, body aches, shortness of breath, nausea, vomiting, diarrhea  History reviewed  No pertinent past medical history  History reviewed  No pertinent surgical history  Current Outpatient Medications   Medication Sig Dispense Refill    Ascorbic Acid (VITAMIN C) 100 MG tablet Take 100 mg by mouth daily      Biotin 1 MG CAPS Take by mouth      Garlic 10 MG CAPS Take by mouth      Na Sulfate-K Sulfate-Mg Sulf (SUPREP BOWEL PREP KIT) 17 5-3 13-1 6 GM/177ML SOLN Take 2 Bottles by mouth see administration instructions Please follow the instructions from the office 2 Bottle 0    omega-3-acid ethyl esters (LOVAZA) 1 g capsule Take 2 g by mouth 2 (two) times a day       No current facility-administered medications for this visit  No Known Allergies    Review of Systems   HENT: Negative for congestion, rhinorrhea and sore throat  Respiratory: Positive for cough  Negative for shortness of breath  Cardiovascular: Negative for chest pain  Gastrointestinal: Negative for diarrhea, nausea and vomiting  Musculoskeletal: Negative for arthralgias and myalgias  I spent 20 minutes with the patient during this visit

## 2020-03-30 NOTE — TELEPHONE ENCOUNTER
----- Message from Amairani Weeks DO sent at 3/29/2020  3:13 PM EDT -----  Called patient to notify her of of COVID-19 positive results  Patient reports lingering dry cough, myalgia & fatigue  Denies SOB and fever  Precautions given self isolation recommended until 72h after resolutions of symptoms

## 2020-03-31 ENCOUNTER — TELEMEDICINE (OUTPATIENT)
Dept: FAMILY MEDICINE CLINIC | Facility: CLINIC | Age: 55
End: 2020-03-31
Payer: COMMERCIAL

## 2020-03-31 DIAGNOSIS — U07.1 COVID-19 VIRUS INFECTION: Primary | ICD-10-CM

## 2020-03-31 PROCEDURE — 99442 PR PHYS/QHP TELEPHONE EVALUATION 11-20 MIN: CPT | Performed by: FAMILY MEDICINE

## 2020-03-31 NOTE — PROGRESS NOTES
Virtual Brief Visit    Problem List Items Addressed This Visit        Other    COVID-19 virus infection - Primary        Discussed with Dr Hiral Valiente  Patient advised to self-quarantine until 4/6  Call office with worsening symptoms  All questions answered and patient verbalized agreement with plan  Reason for visit is COVID follow up    Encounter provider Denver German DO    Provider located at [address] [place] [phone]    Recent Visits  Date Type Provider Dept   03/30/20  [facility]   03/30/20 Telephone ER[facility]IRA Valdes [facility]   Showing recent visits within past 7 days and meeting all other requirements     Today's Visits  Date Type Provider Dept   03/31/20 Telemedicine Denver German DO [facility]   Showing today's visits and meeting all other requirements     Future Appointments  No visits were found meeting these conditions  Showing future appointments within next 150 days and meeting all other requirements        After connecting through telephone, the patient was identified by name and date of birth  Rj Swanson was informed that this is a telemedicine visit and that the visit is being conducted through telephone  She acknowledged consent and understanding of privacy and security of the video platform  The patient has agreed to participate and understands they can discontinue the visit at any time  Patient is aware this is a billable service  Subjective  Rj Swanson is a 62 y o  female Rj Swanson is a 62 y o  female whose COVID test resulted (+) on 3/29  She initially was contacted for virtual visit on 3/23/20 via telephone reporting fever, dry cough following exposure to confirmed COVID (+) at a [employer] service on 3/15/20  Today she reports that her symptoms are much improved  She continues to have mild intermittent dry cough   She is self-quarantine din her house  Denies fever, chills, body aches, SOB, nausea, vomiting  No past medical history on file  No past surgical history on file  Current Outpatient Medications   Medication Sig Dispense Refill    Ascorbic Acid (VITAMIN C) 100 MG tablet Take 100 mg by mouth daily      Biotin 1 MG CAPS Take by mouth      Garlic 10 MG CAPS Take by mouth      Na Sulfate-K Sulfate-Mg Sulf (SUPREP BOWEL PREP KIT) 17 5-3 13-1 6 GM/177ML SOLN Take 2 Bottles by mouth see administration instructions Please follow the instructions from the office 2 Bottle 0    omega-3-acid ethyl esters (LOVAZA) 1 g capsule Take 2 g by mouth 2 (two) times a day       No current facility-administered medications for this visit  No Known Allergies    Review of Systems    I spent 15 minutes with the patient during this visit

## 2020-03-31 NOTE — PROGRESS NOTES
Virtual Visit - I discussed case with resident and reviewed resident note, prescribed medication and orders placed  I Supervised the resident and agree with the resident management plan as it was presented to me  I was available but did NOT personally participate in the virtual visit with the patient and resident        Emanuel Harley MD 03/31/20         Emanuel Harley MD 03/31/20         Emanuel Harley MD 03/31/20           Emanuel Harley MD 03/31/20

## 2020-04-01 ENCOUNTER — TELEMEDICINE (OUTPATIENT)
Dept: FAMILY MEDICINE CLINIC | Facility: CLINIC | Age: 55
End: 2020-04-01
Payer: COMMERCIAL

## 2020-04-01 DIAGNOSIS — U07.1 COVID-19 VIRUS INFECTION: Primary | ICD-10-CM

## 2020-04-01 PROCEDURE — G2012 BRIEF CHECK IN BY MD/QHP: HCPCS | Performed by: FAMILY MEDICINE

## 2020-04-01 NOTE — PROGRESS NOTES
I reviewed and concur with the care furnished by the Resident  I reviewed the medical history and diagnosis, the Resident's findings on physical examination and the treatment plan  I was present in the office but did not examine patient      Ponce Mcwilliams MD 04/01/20

## 2020-04-02 ENCOUNTER — TELEMEDICINE (OUTPATIENT)
Dept: FAMILY MEDICINE CLINIC | Facility: CLINIC | Age: 55
End: 2020-04-02

## 2020-04-02 DIAGNOSIS — U07.1 COVID-19 VIRUS INFECTION: Primary | ICD-10-CM

## 2020-04-03 ENCOUNTER — TELEMEDICINE (OUTPATIENT)
Dept: FAMILY MEDICINE CLINIC | Facility: CLINIC | Age: 55
End: 2020-04-03

## 2020-04-03 DIAGNOSIS — U07.1 COVID-19 VIRUS INFECTION: Primary | ICD-10-CM

## 2020-05-08 ENCOUNTER — PATIENT OUTREACH (OUTPATIENT)
Dept: FAMILY MEDICINE CLINIC | Facility: CLINIC | Age: 55
End: 2020-05-08

## 2020-05-14 ENCOUNTER — TELEPHONE (OUTPATIENT)
Dept: CCU | Facility: HOSPITAL | Age: 55
End: 2020-05-14

## 2022-11-21 ENCOUNTER — TELEPHONE (OUTPATIENT)
Dept: FAMILY MEDICINE CLINIC | Facility: CLINIC | Age: 57
End: 2022-11-21